# Patient Record
(demographics unavailable — no encounter records)

---

## 2019-09-25 PROBLEM — Z34.00 SUPERVISION OF NORMAL FIRST PREGNANCY, ANTEPARTUM: Status: ACTIVE | Noted: 2019-09-25

## 2019-09-25 PROCEDURE — 87086 URINE CULTURE/COLONY COUNT: CPT | Performed by: OBSTETRICS & GYNECOLOGY

## 2019-09-25 PROCEDURE — 88175 CYTOPATH C/V AUTO FLUID REDO: CPT | Performed by: OBSTETRICS & GYNECOLOGY

## 2019-10-02 PROBLEM — O20.9 BLEEDING IN EARLY PREGNANCY: Status: ACTIVE | Noted: 2019-10-02

## 2019-10-02 PROCEDURE — 86901 BLOOD TYPING SEROLOGIC RH(D): CPT | Performed by: OBSTETRICS & GYNECOLOGY

## 2019-10-02 PROCEDURE — 86850 RBC ANTIBODY SCREEN: CPT | Performed by: OBSTETRICS & GYNECOLOGY

## 2019-10-02 PROCEDURE — 86900 BLOOD TYPING SEROLOGIC ABO: CPT | Performed by: OBSTETRICS & GYNECOLOGY

## 2019-12-17 PROBLEM — O28.0 ABNORMAL ANTENATAL AFP SCREEN: Status: ACTIVE | Noted: 2019-12-17

## 2019-12-17 PROBLEM — O46.8X9 SUBCHORIONIC HEMATOMA: Status: ACTIVE | Noted: 2019-12-17

## 2019-12-17 PROBLEM — O41.8X90 SUBCHORIONIC HEMATOMA: Status: ACTIVE | Noted: 2019-12-17

## 2025-01-22 NOTE — TELEPHONE ENCOUNTER
Patients name &  verified with patient   Lab tubes labeled   NIPT/Panorama screen lab drawn  Patient tolerated well. Test given to PSR for processing and send out.   Sena information given and patient instructed to call in 7-10 days to discuss results. Patient verbalized understanding.

## 2025-01-22 NOTE — PROGRESS NOTES
Initial OB - 10w3d         OBhx -  - per pt last two deliveries uncomplicated,  x2   PMHx - . Denies blood transfusion,  HIV, hepatitis, HSV, VTE, or congenital heart defect   Psurghx - wisdom teeth   Last pap , results neg, Hpv not reflexed     30 year old , EDC by 10w3d US   -NIPT discussed - drawn today  -Carrier discussed - declined         H/o fetal demise at 21 wks   -per epic h/o bleeding, possible abruption, subchorionic hematoma   -at today's US - no subchorionic bleeding reported     Overweight (pre preg BMI 27)  -recommended wt gain 15-25 lbs     RTC 4 wks,  next appt with OBs

## 2025-02-19 NOTE — PROGRESS NOTES
BRITTNY 14w3d    Doing okay. Feels very tired. Also getting over a cold. Provided recs for safe OTC medication in pregnancy.   Advised adequate hydration and regular food intake. Is taking supplemental iron    30 year old  dated by 1st tri US     #Routine prenatal care  - NIPT: low risk  - Carrier screening: declined    #H/o fetal demise at 21 wks   -per epic h/o bleeding, possible abruption, subchorionic hematoma   - had two subsequent normal vaginal deliveries  -at initial US - no subchorionic bleeding reported      #Overweight (pre preg BMI 27)  -recommended wt gain 15-25 lbs    #Fe deficiency  - recommend supplemental PO iron: pt taking

## 2025-02-20 NOTE — PATIENT INSTRUCTIONS
Dealing with \"colds\"     Congestion  -steam, hot showers, hot tea, broth/soups  -nasal saline  -Mucinex (guaifenesin) can loosen up secretions  -nasal corticosteroids (e.g. Nasocort, Rhinocort)  -antihistamines such as benadryl, claritin, zyrtec are ok   -AVOID Afrin. It is a vasoconstrictor and can increase blood pressure.   -Tylenol for discomfort    Cough  -steam, hot showers, hot tea, broth/soups  -tea with honey  -Mucinex (guaifenesin) can loosen up secretions  -Robitussin to suppress cough  -lozenges are ok, though minimize the medicated ones.   -Tylenol for discomfort  -if cough is severe, wheezing, etc, you should be evaluated for need for possible inhaler for bronchospasm &/or possible need for antibiotics for pneumonia    Fever (Temperature of 100.4 degrees F or more)  -take Tylenol to reduce fever, especially during pregnancy.     *MOST illnesses will be caused by common viruses.     If very sore throat, concerned for strep throat - see primary care physician or go to an Immediate Care/Urgent Care facility for rapid testing and treatment.     If fever, body aches, chills, diarrhea, etc & concerned for influenza (the \"flu\") - go to primary care doctor or Immediate Care/Urgent Care if you feel you need to be tested &/or evaluated. We can call in Tamiflu for you, but this can only shorten the duration & lessen the severity of the INFLUENZA virus and will NOT work on other viruses or bacterial infections.

## 2025-03-10 NOTE — TELEPHONE ENCOUNTER
17      C/o vomiting and diarrhea starting at 4 AM 3/10/2025. Patient denies fever. Patient states her kid's stomachs were upset and assumes she caught the stomach bug from them.     Patient states she has urinated only once today. Advised patient if she is unable to keep anything down for 24 hours or urinates less than 3 times in a day to call us back. Advised patient to try and take small sips of water or electrolyte drinks, choose bland food to eat, rest. Understanding verbalized.     Sent [employer] message with Medications Safe in Pregnancy.

## 2025-03-10 NOTE — TELEPHONE ENCOUNTER
Pt is sick with a stomach virus. Has been vomiting and having diarrhea. Pt would like to discuss medications to take with a nurse.

## 2025-03-19 NOTE — PROGRESS NOTES
Martín 18w3d     She is fell on the ice last month, landed on her buttocks and lower back. Denies vaginal bleeding.     GILMA dated by 1st tri       #Routine prenatal care  - NIPT: low risk  - Carrier screening: declined  -anatomy scan discussed and ordered      #H/o fetal demise at 21 wks   -per epic h/o bleeding, possible abruption, subchorionic hematoma   - had two subsequent normal vaginal deliveries  -at initial US - no subchorionic bleeding reported      #Overweight (pre preg BMI 27)  -recommended wt gain 15-25 lbs     #Fe deficiency  - recommend supplemental PO iron: pt taking

## 2025-04-09 NOTE — PROGRESS NOTES
21w3d    Patient has no complaints    GILMA dated by 1st tri US      #Routine prenatal care  - NIPT: low risk  - Carrier screening: declined  -anatomy scan - sub optimal heart - f/u 2 weeks     #H/o fetal demise at 21 wks   -per epic h/o bleeding, possible abruption, subchorionic hematoma   - had two subsequent normal vaginal deliveries  -at initial US - no subchorionic bleeding reported      #Overweight (pre preg BMI 27)  -recommended wt gain 15-25 lbs     #Fe deficiency  - recommend supplemental PO iron: pt taking

## 2025-04-23 NOTE — PROGRESS NOTES
Martín 23w3d    She had an upset stomach the other day, then has some lower abdominal tightening. Feeling better     GILMA dated by 1st tri US      #Routine prenatal care  - NIPT: low risk  - Carrier screening: declined  -anatomy scan - sub optimal heart - f/u done today, prelim report: all hear views seen, appear normal   -1 hr gtt & CBC ordered - please discuss at next visit      #H/o fetal demise at 21 wks   -per epic h/o bleeding, possible abruption, subchorionic hematoma   - had two subsequent normal vaginal deliveries  -at initial US - no subchorionic bleeding reported      #Overweight (pre preg BMI 27)  -recommended wt gain 15-25 lbs     #Fe deficiency  - recommend supplemental PO iron: pt taking

## 2025-05-23 NOTE — PROGRESS NOTES
Martín 27.5    +FM, no LOF, no VB, no ctx   Some days doesn't feel as much     GILMA dated by 1st tri US      #Routine prenatal care  - NIPT: low risk  - Carrier screening: declined  -anatomy scan - sub optimal heart - f/u US normal   -28 weeks labs all ordered      #H/o fetal demise at 21 wks   -per epic h/o bleeding, possible abruption, subchorionic hematoma   - had two subsequent normal vaginal deliveries  -at initial US - no subchorionic bleeding reported      #Overweight (pre preg BMI 27)  -recommended wt gain 15-25 lbs     #Fe deficiency  - recommend supplemental PO iron: pt taking

## 2025-05-30 NOTE — PROGRESS NOTES
Patient aware of CBC results and recommendations. CBC results are normal.     Instructed to complete the one hour gtt. Patient verbalized understanding.

## 2025-06-06 NOTE — ED PROVIDER NOTES
Patient Seen in: [facility]        History  Chief Complaint   Patient presents with    Cough     Nasal and sinus congestion, severe [id number] weeks pregnant - Entered by patient    Nasal Congestion     Stated Complaint: Cough - Nasal and sinus congestion, severe weakness    Subjective:   HPI            Patient is a 30-year-old female, 20 weeks pregnant, presenting to for evaluation of nasal/sinus congestion with cough for over 1 week.  Initially attributed symptoms to underlying allergies.  She has been having now worsening nasal/sinus pain/pressure/congestion with associated cough. Symptoms unrelieved with Tylenol, Zyrtec, and saline spray.  Coming to immediate care for further evaluation.  Recent children with cold symptoms resolved.  She is 29 weeks pregnant.  Denies any OB/GYN complaints.  No fevers.  No chest pain or shortness of breath.  No abdominal.      Objective:     History reviewed. No pertinent past medical history.           Past Surgical History:   Procedure Laterality Date    [place] teeth removed Bilateral     2011 per pt                Social History     Socioeconomic History    Marital status: Single   Tobacco Use    Smoking status: Never    Smokeless tobacco: Never   Vaping Use    Vaping status: Never Used   Substance and Sexual Activity    Alcohol use: Never    Drug use: Never    Sexual activity: Yes     Partners: Male              Review of Systems   Constitutional:  Positive for activity change, chills and fatigue. Negative for fever.   HENT:  Positive for congestion, sinus pressure and sinus pain.    Respiratory:  Positive for cough. Negative for shortness of breath.    Cardiovascular:  Negative for chest pain.   Gastrointestinal:  Negative for abdominal pain, diarrhea and vomiting.   Musculoskeletal:  Negative for back pain, neck pain and neck stiffness.   Skin:  Negative for rash.   Allergic/Immunologic: Positive for environmental allergies. Negative for immunocompromised  state.   Neurological:  Negative for dizziness, weakness, light-headedness and headaches.   Psychiatric/Behavioral:  Negative for confusion.    All other systems reviewed and are negative.      Positive for stated complaint: Cough - Nasal and sinus congestion, severe weakness  Other systems are as noted in HPI.  Constitutional and vital signs reviewed.      All other systems reviewed and negative except as noted above.                  Physical Exam    ED Triage Vitals [06/06/25 1622]   /69   Pulse 111   Resp 20   Temp 98.3 °F (36.8 °C)   Temp src Oral   SpO2 96 %   O2 Device None (Room air)       Current Vitals:   Vital Signs  BP: 118/69  Pulse: 111  Resp: 20  Temp: 98.3 °F (36.8 °C)  Temp src: Oral    Oxygen Therapy  SpO2: 96 %  O2 Device: None (Room air)            Physical Exam  Vitals and nursing note reviewed.   Constitutional:       General: She is not in acute distress.     Appearance: Normal appearance. She is not ill-appearing, toxic-appearing or diaphoretic.      Comments: Alert, pleasant, nontoxic-appearing   HENT:      Head: Normocephalic and atraumatic.      Ears:      Comments: Bilateral ear effusion     Nose: Congestion present.      Comments: Dried yellow crusted naris.     Mouth/Throat:      Mouth: Mucous membranes are moist.      Comments: Postnasal drip.  Eyes:      Conjunctiva/sclera: Conjunctivae normal.   Cardiovascular:      Rate and Rhythm: Tachycardia present.      Pulses: Normal pulses.   Pulmonary:      Effort: Pulmonary effort is normal. No respiratory distress.      Comments: Lungs clear without rales or wheezing.  Abdominal:      Comments: Gravid abdomen.   Musculoskeletal:         General: No deformity. Normal range of motion.      Cervical back: Normal range of motion and neck supple. No rigidity.   Neurological:      General: No focal deficit present.      Mental Status: She is alert and oriented to person, place, and time.      Motor: No weakness.      Gait: Gait normal.    Psychiatric:         Mood and Affect: Mood normal.         Behavior: Behavior normal.             ED Course  Labs Reviewed   POCT FLU TEST - Normal    Narrative:     This assay is a rapid molecular in vitro test utilizing nucleic acid amplification of influenza A and B viral RNA.   RAPID SARS-COV-2 BY PCR - Normal     Results for orders placed or performed during the hospital encounter of 06/06/25   POCT Flu Test    Collection Time: 06/06/25  4:26 PM    Specimen: Nares; Other   Result Value Ref Range    POCT INFLUENZA A Negative Negative    POCT INFLUENZA B Negative Negative   Rapid SARS-CoV-2 by PCR    Collection Time: 06/06/25  4:26 PM    Specimen: Nares; Other   Result Value Ref Range    Rapid SARS-CoV-2 by PCR Not Detected Not Detected                  MDM    Differential diagnoses considered included, but are not exclusive of: URI, influenza, COVID, otitis, sinusitis, pharyngitis, strep throat, bronchitis, pneumonia, etc.      Dx: Acute Bacterial Sinusitis, Initial Encounter  URI cough and congestion for 1 week  COVID and Influenza PCR negative  Worsening symptoms  + PODS Criteria  No systemic symptoms  Overall well-appearing  Afebrile  Outpatient Management  Supportive Care  Rest Oral Hydration  /Tylenol as needed for pain/fever  Flonase and Zyrtec for nasal congestion/sinus pressure  OTC antitussive  Rx Amoxicillin twice daily for 7 days for acute bacterial sinusitis  Discharge instructions on sinusitis  PCP follow-up  ED return precautions          Medical Decision Making      Disposition and Plan     Clinical Impression:  1. Acute non-recurrent maxillary sinusitis    2. Acute cough    3. Third trimester pregnancy (HCC)    4. History of allergic rhinitis         Disposition:  Discharge  6/6/2025  4:42 pm    Follow-up:  No follow-up provider specified.        Medications Prescribed:  Discharge Medication List as of 6/6/2025  4:43 PM        START taking these medications    Details   fluticasone propionate  50 MCG/ACT Nasal Suspension 2 sprays by Nasal route daily., Normal, Disp-16 g, R-0                   Supplementary Documentation:

## 2025-06-06 NOTE — ED INITIAL ASSESSMENT (HPI)
Patient arrived ambulatory to room c/o symptoms that started 1 week ago. +nasal congestion +sinus pressure +body aches/chills. No known fevers. Slight cough. No n/v/d. Easy non labored respirations. Patient states she is currently 30 weeks pregnant.

## 2025-06-06 NOTE — TELEPHONE ENCOUNTER
Pt is not feeling well, over the counter meds did not help, she is going to Immediate care, is concerned that if antibiotics prescribed is it ok to take?

## 2025-06-06 NOTE — TELEPHONE ENCOUNTER
29 5/7 worsen congested, sinus pain, sore throat    Tried OTC without relief    Can go to IM for evaluation as planned.  Patient verbalized understanding, agreed to and intend to comply with plan of care.

## 2025-06-09 NOTE — PROGRESS NOTES
Martín 30w1d    6/6/25: went to IC for cough and nasal/sinus congestion. Received nasal spray and antibiotics. She is having right upper quad pain, reports it is consistent. Bp today in clinic 110/73, CMP ordered out of caution. Most likely MSKL.     GILMA dated by 1st tri US      #Routine prenatal care  - NIPT: low risk  - Carrier screening: declined  -anatomy scan - sub optimal heart - f/u US normal   -1hr gtt & CBC:NL   -3rd tri HIV & T pal: done      #H/o fetal demise at 21 wks   -per epic h/o bleeding, possible abruption, subchorionic hematoma   - had two subsequent normal vaginal deliveries  -at initial US - no subchorionic bleeding reported      #Overweight (pre preg BMI 27)  -recommended wt gain 15-25 lbs     #Fe deficiency  - recommend supplemental PO iron: pt taking     RTC 2 wk

## 2025-06-25 NOTE — PROGRESS NOTES
Martín 32w3d    Doing well. Some increased pressure - belly band helps. + FM    GILMA dated by 1st tri US      #Routine prenatal care  - NIPT: low risk  - Carrier screening: declined  -anatomy scan - sub optimal heart - f/u US normal   -1hr gtt & CBC:NL   -3rd tri HIV & T pal: done      #H/o fetal demise at 21 wks   -per epic h/o bleeding, possible abruption, subchorionic hematoma   - had two subsequent normal vaginal deliveries  -at initial US - no subchorionic bleeding reported      #Overweight (pre preg BMI 27)  -recommended wt gain 15-25 lbs     #Fe deficiency  - recommend supplemental PO iron: pt taking     RTC 2 wk

## 2025-07-09 NOTE — PROGRESS NOTES
Martín 34w3d    She is having vaginal odor for 2 wk. Is concerned if she has a yeast infection. Denies vaginal itching. Vaginitis Pcr done     GILMA dated by 1st tri US      #Routine prenatal care  - NIPT: low risk  - Carrier screening: declined  -anatomy scan - sub optimal heart - f/u US normal   -1hr gtt & CBC:NL   -3rd tri HIV & T pal: done   -tdap 5/23/25     #H/o fetal demise at 21 wks   -per epic h/o bleeding, possible abruption, subchorionic hematoma   - had two subsequent normal vaginal deliveries  -at initial US - no subchorionic bleeding reported      #Overweight (pre preg BMI 27)  -recommended wt gain 15-25 lbs     #Fe deficiency  - recommend supplemental PO iron: pt taking      RTC 2 wk

## 2025-07-14 NOTE — PROGRESS NOTES
Martín 35w1d    Having vaginal discharge, yellowish, thin, denies vaginal itching   Spec exam done: thick yellowish whitish vaginal discharge seen; Vaginitis PcR done.       GILMA dated by 1st tri US      #Routine prenatal care  - NIPT: low risk  - Carrier screening: declined  -anatomy scan - sub optimal heart - f/u US normal   -1hr gtt & CBC:NL   -3rd tri HIV & T pal: done   -tdap 5/23/25     #H/o fetal demise at 21 wks   -per epic h/o bleeding, possible abruption, subchorionic hematoma   - had two subsequent normal vaginal deliveries  -at initial US - no subchorionic bleeding reported      #Overweight (pre preg BMI 27)  -recommended wt gain 15-25 lbs     #Fe deficiency  - recommend supplemental PO iron: pt taking      RTC 1 wk, GBS at next visit

## 2025-07-14 NOTE — TELEPHONE ENCOUNTER
Spoke with patient. 35 weeks 1 day. Having yellowish watery discharge. Can be thicker at times. Denies itching or irritation. Appointment scheduled today at 2:15 with Sonia. Verbalized understanding.

## 2025-07-25 NOTE — PROGRESS NOTES
36w5d  Patient c/o pelvic pressure  Cx: cl/50/-3  GBS done  - labor instructions  Declines IOL at this time     GILMA dated by 1st tri US      #Routine prenatal care  - NIPT: low risk  - Carrier screening: declined  -anatomy scan - sub optimal heart - f/u US normal   -1hr gtt & CBC:NL   -3rd tri HIV & T pal: done   -tdap 5/23/25     #H/o fetal demise at 21 wks   -per epic h/o bleeding, possible abruption, subchorionic hematoma   - had two subsequent normal vaginal deliveries  -at initial US - no subchorionic bleeding reported      #Overweight (pre preg BMI 27)  -recommended wt gain 15-25 lbs     #Fe deficiency  - recommend supplemental PO iron: pt taking